# Patient Record
Sex: FEMALE | Race: WHITE | NOT HISPANIC OR LATINO
[De-identification: names, ages, dates, MRNs, and addresses within clinical notes are randomized per-mention and may not be internally consistent; named-entity substitution may affect disease eponyms.]

---

## 2017-06-08 ENCOUNTER — APPOINTMENT (OUTPATIENT)
Dept: PULMONOLOGY | Facility: CLINIC | Age: 77
End: 2017-06-08

## 2017-06-08 VITALS
DIASTOLIC BLOOD PRESSURE: 60 MMHG | WEIGHT: 169.5 LBS | BODY MASS INDEX: 33.1 KG/M2 | HEART RATE: 79 BPM | SYSTOLIC BLOOD PRESSURE: 110 MMHG | OXYGEN SATURATION: 96 %

## 2017-06-08 RX ORDER — CIPROFLOXACIN HYDROCHLORIDE 500 MG/1
500 TABLET, FILM COATED ORAL
Qty: 20 | Refills: 0 | Status: DISCONTINUED | COMMUNITY
Start: 2017-02-27

## 2017-06-08 RX ORDER — ALBUTEROL SULFATE 90 UG/1
108 (90 BASE) AEROSOL, METERED RESPIRATORY (INHALATION)
Qty: 1 | Refills: 3 | Status: DISCONTINUED | COMMUNITY
Start: 2017-06-08 | End: 2017-06-08

## 2017-08-25 ENCOUNTER — APPOINTMENT (OUTPATIENT)
Dept: CARDIOLOGY | Facility: CLINIC | Age: 77
End: 2017-08-25

## 2017-10-26 ENCOUNTER — APPOINTMENT (OUTPATIENT)
Dept: CARDIOLOGY | Facility: CLINIC | Age: 77
End: 2017-10-26
Payer: MEDICARE

## 2017-10-26 VITALS
DIASTOLIC BLOOD PRESSURE: 81 MMHG | SYSTOLIC BLOOD PRESSURE: 124 MMHG | HEART RATE: 92 BPM | WEIGHT: 168 LBS | OXYGEN SATURATION: 97 % | BODY MASS INDEX: 32.81 KG/M2

## 2017-10-26 DIAGNOSIS — Z82.0 FAMILY HISTORY OF EPILEPSY AND OTHER DISEASES OF THE NERVOUS SYSTEM: ICD-10-CM

## 2017-10-26 DIAGNOSIS — Z83.3 FAMILY HISTORY OF DIABETES MELLITUS: ICD-10-CM

## 2017-10-26 DIAGNOSIS — Z81.8 FAMILY HISTORY OF OTHER MENTAL AND BEHAVIORAL DISORDERS: ICD-10-CM

## 2017-10-26 PROCEDURE — 99204 OFFICE O/P NEW MOD 45 MIN: CPT

## 2017-10-26 PROCEDURE — 93000 ELECTROCARDIOGRAM COMPLETE: CPT

## 2017-10-29 ENCOUNTER — NON-APPOINTMENT (OUTPATIENT)
Age: 77
End: 2017-10-29

## 2017-10-29 PROBLEM — Z81.8 FAMILY HISTORY OF DEMENTIA: Status: ACTIVE | Noted: 2017-10-26

## 2017-11-21 ENCOUNTER — APPOINTMENT (OUTPATIENT)
Dept: CARDIOLOGY | Facility: CLINIC | Age: 77
End: 2017-11-21
Payer: MEDICARE

## 2017-11-21 PROCEDURE — A9500: CPT

## 2017-11-21 PROCEDURE — 93306 TTE W/DOPPLER COMPLETE: CPT

## 2017-11-21 PROCEDURE — 93015 CV STRESS TEST SUPVJ I&R: CPT

## 2017-11-21 PROCEDURE — 78452 HT MUSCLE IMAGE SPECT MULT: CPT

## 2017-12-07 ENCOUNTER — APPOINTMENT (OUTPATIENT)
Dept: PULMONOLOGY | Facility: CLINIC | Age: 77
End: 2017-12-07
Payer: MEDICARE

## 2017-12-07 VITALS
WEIGHT: 170 LBS | SYSTOLIC BLOOD PRESSURE: 125 MMHG | BODY MASS INDEX: 33.2 KG/M2 | OXYGEN SATURATION: 98 % | DIASTOLIC BLOOD PRESSURE: 70 MMHG | HEART RATE: 76 BPM

## 2017-12-07 PROCEDURE — 99214 OFFICE O/P EST MOD 30 MIN: CPT

## 2018-07-12 ENCOUNTER — APPOINTMENT (OUTPATIENT)
Dept: PULMONOLOGY | Facility: CLINIC | Age: 78
End: 2018-07-12
Payer: MEDICARE

## 2018-07-12 VITALS
OXYGEN SATURATION: 96 % | HEART RATE: 92 BPM | WEIGHT: 164 LBS | SYSTOLIC BLOOD PRESSURE: 118 MMHG | DIASTOLIC BLOOD PRESSURE: 80 MMHG | BODY MASS INDEX: 32.03 KG/M2

## 2018-07-12 DIAGNOSIS — R91.8 OTHER NONSPECIFIC ABNORMAL FINDING OF LUNG FIELD: ICD-10-CM

## 2018-07-12 PROCEDURE — 99214 OFFICE O/P EST MOD 30 MIN: CPT | Mod: 25

## 2018-07-12 PROCEDURE — 94010 BREATHING CAPACITY TEST: CPT

## 2018-07-12 RX ORDER — ALBUTEROL SULFATE 108 UG/1
108 (90 BASE) AEROSOL, METERED RESPIRATORY (INHALATION)
Qty: 1 | Refills: 5 | Status: DISCONTINUED | COMMUNITY
Start: 2017-06-08 | End: 2018-07-12

## 2018-07-12 RX ORDER — ALBUTEROL SULFATE 90 UG/1
108 (90 BASE) AEROSOL, METERED RESPIRATORY (INHALATION)
Qty: 1 | Refills: 6 | Status: COMPLETED | COMMUNITY
Start: 2018-07-12 | End: 2018-07-12

## 2018-10-29 ENCOUNTER — APPOINTMENT (OUTPATIENT)
Dept: CARDIOLOGY | Facility: CLINIC | Age: 78
End: 2018-10-29
Payer: MEDICARE

## 2018-10-29 ENCOUNTER — NON-APPOINTMENT (OUTPATIENT)
Age: 78
End: 2018-10-29

## 2018-10-29 VITALS — SYSTOLIC BLOOD PRESSURE: 127 MMHG | DIASTOLIC BLOOD PRESSURE: 78 MMHG | OXYGEN SATURATION: 95 %

## 2018-10-29 VITALS
DIASTOLIC BLOOD PRESSURE: 83 MMHG | HEART RATE: 90 BPM | RESPIRATION RATE: 16 BRPM | SYSTOLIC BLOOD PRESSURE: 131 MMHG | WEIGHT: 179 LBS | HEIGHT: 62 IN | BODY MASS INDEX: 32.94 KG/M2

## 2018-10-29 DIAGNOSIS — Z87.891 PERSONAL HISTORY OF NICOTINE DEPENDENCE: ICD-10-CM

## 2018-10-29 DIAGNOSIS — R09.89 OTHER SPECIFIED SYMPTOMS AND SIGNS INVOLVING THE CIRCULATORY AND RESPIRATORY SYSTEMS: ICD-10-CM

## 2018-10-29 DIAGNOSIS — Z87.09 PERSONAL HISTORY OF OTHER DISEASES OF THE RESPIRATORY SYSTEM: ICD-10-CM

## 2018-10-29 PROCEDURE — 99214 OFFICE O/P EST MOD 30 MIN: CPT

## 2018-10-29 PROCEDURE — 93000 ELECTROCARDIOGRAM COMPLETE: CPT

## 2018-11-08 ENCOUNTER — APPOINTMENT (OUTPATIENT)
Dept: CARDIOLOGY | Facility: CLINIC | Age: 78
End: 2018-11-08
Payer: MEDICARE

## 2018-11-08 PROCEDURE — 93925 LOWER EXTREMITY STUDY: CPT

## 2019-06-13 ENCOUNTER — NON-APPOINTMENT (OUTPATIENT)
Age: 79
End: 2019-06-13

## 2019-06-13 ENCOUNTER — APPOINTMENT (OUTPATIENT)
Dept: CARDIOLOGY | Facility: CLINIC | Age: 79
End: 2019-06-13
Payer: MEDICARE

## 2019-06-13 VITALS — RESPIRATION RATE: 16 BRPM | WEIGHT: 170 LBS | HEIGHT: 61 IN | BODY MASS INDEX: 32.1 KG/M2

## 2019-06-13 VITALS
DIASTOLIC BLOOD PRESSURE: 70 MMHG | OXYGEN SATURATION: 98 % | HEIGHT: 61 IN | WEIGHT: 170 LBS | BODY MASS INDEX: 32.1 KG/M2 | RESPIRATION RATE: 16 BRPM | HEART RATE: 90 BPM | SYSTOLIC BLOOD PRESSURE: 126 MMHG

## 2019-06-13 DIAGNOSIS — R01.1 CARDIAC MURMUR, UNSPECIFIED: ICD-10-CM

## 2019-06-13 PROCEDURE — 99214 OFFICE O/P EST MOD 30 MIN: CPT

## 2019-06-13 PROCEDURE — 93000 ELECTROCARDIOGRAM COMPLETE: CPT

## 2019-06-13 RX ORDER — ALBUTEROL SULFATE 90 UG/1
108 (90 BASE) AEROSOL, METERED RESPIRATORY (INHALATION)
Qty: 1 | Refills: 3 | Status: DISCONTINUED | COMMUNITY
Start: 2018-07-12 | End: 2019-06-13

## 2019-06-13 RX ORDER — FLUTICASONE PROPIONATE 50 UG/1
50 SPRAY, METERED NASAL
Qty: 1 | Refills: 3 | Status: DISCONTINUED | COMMUNITY
Start: 2017-06-08 | End: 2019-06-13

## 2019-06-13 NOTE — ASSESSMENT
[FreeTextEntry1] : pt will see PMD for blood work\par Lipid panel is not known, she will forward labs to me\par BP is to goal\par FU with Dr Chavez\par Coronary artery calcification, does not want statins. we will check labs when available\par FU in 1 year if no new symptoms\par Patient was advised to partake in 150 minutes of moderate exercise per week.\par

## 2019-06-13 NOTE — HISTORY OF PRESENT ILLNESS
[FreeTextEntry1] : 78 yo male who is seen with hyperlipidemia and coronary calcification. \par \par She has COPD, does not smoke any longer. Quit over 16 years ago. No cp or palpitations but does get short of breath when climbing uphill. She exercise when she is in [place] half of the year. \par \par TTE 11,2017: Normal LV function, grade I DD, mild aortic valve regurgitation.\par nuclear stress test in 2017: Negative for ischemia, GI uptake in the inferoseptal region. .

## 2019-06-13 NOTE — REVIEW OF SYSTEMS
[Dyspnea on exertion] : dyspnea during exertion [Joint Pain] : joint pain [Recent Weight Gain (___ Lbs)] : no recent weight gain [Feeling Fatigued] : not feeling fatigued [Headache] : no headache [Blurry Vision] : no blurred vision [Recent Weight Loss (___ Lbs)] : no recent weight loss [Seeing Double (Diplopia)] : no diplopia [Discharge From The Ears] : no discharge from the ears [Earache] : no earache [Eyeglasses] : not currently wearing eyeglasses [Sore Throat] : no sore throat [Chest  Pressure] : no chest pressure [Chest Pain] : no chest pain [Lower Ext Edema] : no extremity edema [Cough] : no cough [Wheezing] : no wheezing [Palpitations] : no palpitations [Abdominal Pain] : no abdominal pain [Nausea] : no nausea [Heartburn] : no heartburn [Incontinence] : no incontinence [Change in Appetite] : no change in appetite [Dysuria] : no dysuria [Joint Swelling] : no joint swelling [Muscle Cramps] : no muscle cramps [Itching] : no itching [Limb Weakness (Paresis)] : no limb weakness [Skin: A Rash] : no rash: [Dizziness] : no dizziness [Skin Lesions] : no skin lesions [Tingling (Paresthesia)] : no tingling [Convulsions] : no convulsions [Tremor] : no tremor was seen [Confusion] : no confusion was observed [Anxiety] : no anxiety [Memory Lapses Or Loss] : no memory lapses or loss [Excessive Thirst] : no polydipsia [Easy Bleeding] : no tendency for easy bleeding [Swollen Glands] : no swollen glands [Easy Bruising] : no tendency for easy bruising

## 2019-06-13 NOTE — PHYSICAL EXAM
[Normal Appearance] : normal appearance [Well Groomed] : well groomed [General Appearance - In No Acute Distress] : no acute distress [Normal Conjunctiva] : the conjunctiva exhibited no abnormalities [Normal Oropharynx] : normal oropharynx [Normal Oral Mucosa] : normal oral mucosa [Normal Jugular Venous V Waves Present] : normal jugular venous V waves present [Normal Jugular Venous A Waves Present] : normal jugular venous A waves present [Auscultation Breath Sounds / Voice Sounds] : lungs were clear to auscultation bilaterally [Respiration, Rhythm And Depth] : normal respiratory rhythm and effort [Chest Palpation] : palpation of the chest revealed no abnormalities [Heart Rate And Rhythm] : heart rate and rhythm were normal [Heart Sounds] : normal S1 and S2 [Abdomen Soft] : soft [Bowel Sounds] : normal bowel sounds [Abdomen Tenderness] : non-tender [] : no hepato-splenomegaly [Abnormal Walk] : normal gait [Cyanosis, Localized] : no localized cyanosis [Nail Clubbing] : no clubbing of the fingernails [Petechial Hemorrhages (___cm)] : no petechial hemorrhages [Skin Color & Pigmentation] : normal skin color and pigmentation [Skin Turgor] : normal skin turgor [Impaired Insight] : insight and judgment were intact [No Venous Stasis] : no venous stasis [Oriented To Time, Place, And Person] : oriented to person, place, and time [Affect] : the affect was normal [FreeTextEntry1] : 1+ b/l dp/pt pulses

## 2019-07-18 ENCOUNTER — APPOINTMENT (OUTPATIENT)
Dept: PULMONOLOGY | Facility: CLINIC | Age: 79
End: 2019-07-18
Payer: MEDICARE

## 2019-07-18 VITALS
BODY MASS INDEX: 32.31 KG/M2 | HEART RATE: 74 BPM | WEIGHT: 171 LBS | SYSTOLIC BLOOD PRESSURE: 118 MMHG | DIASTOLIC BLOOD PRESSURE: 78 MMHG | OXYGEN SATURATION: 96 %

## 2019-07-18 PROCEDURE — 99214 OFFICE O/P EST MOD 30 MIN: CPT

## 2019-07-18 NOTE — REASON FOR VISIT
[Follow-Up] : a follow-up visit [Abnormal CT Scan] : abnormal CT Scan [Cough] : cough [FreeTextEntry2] : Lung nodule

## 2019-07-18 NOTE — PROCEDURE
[FreeTextEntry1] : CT scan reviewed December 2016; mild bronchiectasis, coronary artery calcifications stable right lower lobe nodule no suspicious abnormalities -compared to November 2014

## 2019-07-18 NOTE — CONSULT LETTER
[Dear  ___] : Dear  [unfilled], [Consult Letter:] : I had the pleasure of evaluating your patient, [unfilled]. [Please see my note below.] : Please see my note below. [Consult Closing:] : Thank you very much for allowing me to participate in the care of this patient.  If you have any questions, please do not hesitate to contact me. [Sincerely,] : Sincerely, [[facility]il[facility], [facility]] : Edilberto Chavez DO, [facility] [Director, Respiratory Care] : Director, Respiratory Care [[facility]] : [facility] [FreeTextEntry3] : [facility]\par Pul[facility] Critical Care\par Director Pulmonary Division\par Medical Director Respiratory Therapy\par [facility]\par \par

## 2019-07-18 NOTE — HISTORY OF PRESENT ILLNESS
[FreeTextEntry1] : dyspnea at baseline\par no fever, chill, chest pain\par denies GERD or rhinitis\par former 40 pack yr smoker, quit 15 yrs ago

## 2019-07-18 NOTE — DISCUSSION/SUMMARY
[FreeTextEntry1] : COPD class I, mild emphysema on CT scan with  stable subcentimeter nodule on CT scan 12/16\par quit smoking 15 yrs ago, does not qualify for low dose rad this yr\par repeat  CXR ordered\par P.r.elfego Wilks, has symbicort samples if worsens\par currently asymptomatic, exam without bronchospasm\par spirometry remains with normal flows, very mild obstruction, no change from 2016\par Vaccinations this fall\par followed by Cardiology\par follow up 8  months or sooner if needed\par

## 2019-07-18 NOTE — PHYSICAL EXAM
[General Appearance - Well Developed] : well developed [Normal Appearance] : normal appearance [Well Groomed] : well groomed [General Appearance - Well Nourished] : well nourished [No Deformities] : no deformities [Heart Rate And Rhythm] : heart rate and rhythm were normal [Heart Sounds] : normal S1 and S2 [Murmurs] : no murmurs present [Edema] : no peripheral edema present [Respiration, Rhythm And Depth] : normal respiratory rhythm and effort [Exaggerated Use Of Accessory Muscles For Inspiration] : no accessory muscle use [Auscultation Breath Sounds / Voice Sounds] : lungs were clear to auscultation bilaterally [Abnormal Walk] : normal gait [] : no rash [No Focal Deficits] : no focal deficits [Oriented To Time, Place, And Person] : oriented to person, place, and time [Impaired Insight] : insight and judgment were intact [Affect] : the affect was normal [Normal Oral Mucosa] : normal oral mucosa [Nail Clubbing] : no clubbing of the fingernails [Cyanosis, Localized] : no localized cyanosis

## 2020-11-02 ENCOUNTER — APPOINTMENT (OUTPATIENT)
Dept: PULMONOLOGY | Facility: CLINIC | Age: 80
End: 2020-11-02
Payer: MEDICARE

## 2020-11-02 VITALS
HEIGHT: 60.5 IN | SYSTOLIC BLOOD PRESSURE: 118 MMHG | WEIGHT: 175 LBS | DIASTOLIC BLOOD PRESSURE: 60 MMHG | BODY MASS INDEX: 33.47 KG/M2 | OXYGEN SATURATION: 90 % | HEART RATE: 82 BPM

## 2020-11-02 VITALS — OXYGEN SATURATION: 95 %

## 2020-11-02 VITALS — OXYGEN SATURATION: 94 %

## 2020-11-02 PROCEDURE — 99215 OFFICE O/P EST HI 40 MIN: CPT

## 2020-11-02 NOTE — CONSULT LETTER
[Dear  ___] : Dear  [unfilled], [Consult Letter:] : I had the pleasure of evaluating your patient, [unfilled]. [Please see my note below.] : Please see my note below. [Consult Closing:] : Thank you very much for allowing me to participate in the care of this patient.  If you have any questions, please do not hesitate to contact me. [Sincerely,] : Sincerely, [[facility]to [facility]avez DO, [facility]] : Edilberto Chavez DO, [facility] [Director, Respiratory Care] : Director, Respiratory Care [[facility]] : [facility] [FreeTextEntry3] : [facility]\par Pulmonary Critical Care\par Director Pulmonary Division\par Medical Director Respiratory Therapy\par [facility]\par \par

## 2020-11-02 NOTE — DISCUSSION/SUMMARY
[FreeTextEntry1] : COPD class I, mild emphysema on CT scan with  stable subcentimeter nodule on CT scan 12/16\par quit smoking 16 yrs ago, does not qualify for low dose rad this yr\par baseline CXR ordered\par P.r.n. Proventil, \par currently asymptomatic, exam without bronchospasm\par last spirometry with normal flows, very mild obstruction, no change from 2016\par Vaccinations this fall planned, had pneumovax\par followed by Cardiology\par follow up 6  months or sooner if needed\par

## 2020-11-05 ENCOUNTER — APPOINTMENT (OUTPATIENT)
Dept: CARDIOLOGY | Facility: CLINIC | Age: 80
End: 2020-11-05
Payer: MEDICARE

## 2020-11-05 ENCOUNTER — NON-APPOINTMENT (OUTPATIENT)
Age: 80
End: 2020-11-05

## 2020-11-05 VITALS
BODY MASS INDEX: 33.47 KG/M2 | DIASTOLIC BLOOD PRESSURE: 80 MMHG | RESPIRATION RATE: 14 BRPM | SYSTOLIC BLOOD PRESSURE: 140 MMHG | OXYGEN SATURATION: 94 % | HEIGHT: 60.5 IN | WEIGHT: 175 LBS | HEART RATE: 76 BPM | TEMPERATURE: 98.4 F

## 2020-11-05 DIAGNOSIS — R07.89 OTHER CHEST PAIN: ICD-10-CM

## 2020-11-05 DIAGNOSIS — R07.9 CHEST PAIN, UNSPECIFIED: ICD-10-CM

## 2020-11-05 DIAGNOSIS — Z86.39 PERSONAL HISTORY OF OTHER ENDOCRINE, NUTRITIONAL AND METABOLIC DISEASE: ICD-10-CM

## 2020-11-05 PROCEDURE — 93000 ELECTROCARDIOGRAM COMPLETE: CPT

## 2020-11-05 PROCEDURE — 99214 OFFICE O/P EST MOD 30 MIN: CPT

## 2020-11-08 PROBLEM — Z86.39 HISTORY OF MIXED HYPERLIPIDEMIA: Status: ACTIVE | Noted: 2019-06-13

## 2020-11-08 PROBLEM — R07.89 ATYPICAL CHEST PAIN: Status: ACTIVE | Noted: 2020-11-05

## 2020-11-08 NOTE — REVIEW OF SYSTEMS
[Headache] : no headache [Recent Weight Gain (___ Lbs)] : recent [unfilled] ~Ulb weight gain [Feeling Fatigued] : not feeling fatigued [Recent Weight Loss (___ Lbs)] : no recent weight loss [Blurry Vision] : no blurred vision [Seeing Double (Diplopia)] : no diplopia [Eyeglasses] : not currently wearing eyeglasses [Earache] : no earache [Discharge From The Ears] : no discharge from the ears [Sore Throat] : no sore throat [Dyspnea on exertion] : dyspnea during exertion [Chest  Pressure] : no chest pressure [Chest Pain] : chest pain [Lower Ext Edema] : no extremity edema [Palpitations] : no palpitations [Cough] : no cough [Wheezing] : no wheezing [Abdominal Pain] : no abdominal pain [Nausea] : no nausea [Heartburn] : no heartburn [Change in Appetite] : no change in appetite [Dysuria] : no dysuria [Incontinence] : no incontinence [Joint Pain] : joint pain [Joint Swelling] : no joint swelling [Muscle Cramps] : no muscle cramps [Limb Weakness (Paresis)] : no limb weakness [Skin: A Rash] : no rash: [Itching] : no itching [Skin Lesions] : no skin lesions [Dizziness] : no dizziness [Tremor] : no tremor was seen [Convulsions] : no convulsions [Tingling (Paresthesia)] : no tingling [Confusion] : no confusion was observed [Memory Lapses Or Loss] : no memory lapses or loss [Anxiety] : no anxiety [Excessive Thirst] : no polydipsia [Easy Bleeding] : no tendency for easy bleeding [Swollen Glands] : no swollen glands [Easy Bruising] : no tendency for easy bruising

## 2020-11-08 NOTE — PHYSICAL EXAM
[Normal Appearance] : normal appearance [Well Groomed] : well groomed [Normal Conjunctiva] : the conjunctiva exhibited no abnormalities [Normal Oral Mucosa] : normal oral mucosa [Normal Oropharynx] : normal oropharynx [Normal Jugular Venous A Waves Present] : normal jugular venous A waves present [Normal Jugular Venous V Waves Present] : normal jugular venous V waves present [Respiration, Rhythm And Depth] : normal respiratory rhythm and effort [Auscultation Breath Sounds / Voice Sounds] : lungs were clear to auscultation bilaterally [Chest Palpation] : palpation of the chest revealed no abnormalities [Heart Rate And Rhythm] : heart rate and rhythm were normal [Heart Sounds] : normal S1 and S2 [Edema] : no peripheral edema present [FreeTextEntry1] : 1+ b/l dp/pt pulses  [Bowel Sounds] : normal bowel sounds [Abdomen Soft] : soft [Abdomen Tenderness] : non-tender [] : no hepato-splenomegaly [Abnormal Walk] : normal gait [Nail Clubbing] : no clubbing of the fingernails [Cyanosis, Localized] : no localized cyanosis [Petechial Hemorrhages (___cm)] : no petechial hemorrhages [Skin Color & Pigmentation] : normal skin color and pigmentation [Skin Turgor] : normal skin turgor [No Venous Stasis] : no venous stasis [Oriented To Time, Place, And Person] : oriented to person, place, and time [Impaired Insight] : insight and judgment were intact [Affect] : the affect was normal

## 2020-11-08 NOTE — HISTORY OF PRESENT ILLNESS
[FreeTextEntry1] : 79 yo male who is seen with hyperlipidemia chest pain and coronary calcification. \par \par She has COPD, does not smoke any longer.\par She states that over the past few months she is that to 3 episodes of sharp pain under the sternum, without any radiation, not related to activity or body movement.  Episodes last short period of time, usually less than a minute.  She denies any chest discomfort with activity.  No shortness of breath or leg edema.  \par \par TTE 11,2017: Normal LV function, grade I DD, mild aortic valve regurgitation.\par nuclear stress test in 2017: Negative for ischemia, GI uptake in the inferoseptal region. .\par \par EKG with normal sinus rhythm, 70 bpm, early RS transition and left anterior hemiblock.  No significant changes on EKG.

## 2020-11-08 NOTE — ASSESSMENT
[FreeTextEntry1] : 80-year-old female with COPD, atypical chest pain and untreated hyperlipidemia.  Her blood pressure initially was elevated but on repeat it was 132/78.\par Patient is complaining of atypical chest pain, not related to activity, not related to food.\par Symptoms have been present for the past 2 to 3 months.  She previously had a nuclear stress test over 3 years ago.  Given degree of coronary calcification seen on CAT scan, had new symptoms I advised her to undergo stress testing.  She knows that if her symptoms worsen to call 911 and go to the nearest emergency room.  She is advised not to exercise and to take a low-dose aspirin daily.  Once we can exclude CAD as a cause of her symptoms, she may need to see a gastroenterologist for further evaluation and possibly upper endoscopy.  Patient will see her primary care physician and have blood work in the next couple of weeks and once the results are available she will forward a copy to us.

## 2020-11-24 ENCOUNTER — APPOINTMENT (OUTPATIENT)
Dept: CARDIOLOGY | Facility: CLINIC | Age: 80
End: 2020-11-24
Payer: MEDICARE

## 2020-11-24 PROCEDURE — 78452 HT MUSCLE IMAGE SPECT MULT: CPT

## 2020-11-24 PROCEDURE — A9500: CPT

## 2020-11-24 PROCEDURE — 93015 CV STRESS TEST SUPVJ I&R: CPT

## 2021-05-28 ENCOUNTER — APPOINTMENT (OUTPATIENT)
Dept: PULMONOLOGY | Facility: CLINIC | Age: 81
End: 2021-05-28
Payer: MEDICARE

## 2021-05-28 VITALS — OXYGEN SATURATION: 96 %

## 2021-05-28 VITALS — WEIGHT: 175 LBS | BODY MASS INDEX: 33.61 KG/M2 | DIASTOLIC BLOOD PRESSURE: 82 MMHG | SYSTOLIC BLOOD PRESSURE: 140 MMHG

## 2021-05-28 PROCEDURE — 99213 OFFICE O/P EST LOW 20 MIN: CPT

## 2021-05-28 NOTE — CONSULT LETTER
[Dear  ___] : Dear  [unfilled], [Consult Letter:] : I had the pleasure of evaluating your patient, [unfilled]. [Please see my note below.] : Please see my note below. [Consult Closing:] : Thank you very much for allowing me to participate in the care of this patient.  If you have any questions, please do not hesitate to contact me. [Sincerely,] : Sincerely, [Edil[facility]to Chavez DO, [facility]] : Edilberto Chavez DO, [facility] [Director, Respiratory Care] : Director, Respiratory Care [[facility]] : [facility] [FreeTextEntry3] : [facility] DO [facility]\par Pulmonary Critical Care\par Director Pulmonary Division\par Medical Director Respiratory Therapy\par [facility]\par \par

## 2021-05-28 NOTE — HISTORY OF PRESENT ILLNESS
[FreeTextEntry1] : dyspnea at baseline\par no fever, chill, chest pain\par denies GERD or rhinitis\par former 40 pack yr smoker, quit 16 yrs ago\par had covid vaccine

## 2021-05-28 NOTE — DISCUSSION/SUMMARY
[FreeTextEntry1] : COPD class I, mild emphysema on CT scan, CXR 11/20 negative\par quit smoking 17 yrs ago, does not qualify for lung cancer screening\par P.r.n. Proventil, \par currently asymptomatic, exam without bronchospasm\par last spirometry with normal flows, very mild obstruction, no change from 2016, will repeat\par Vaccinations: had pneumovax, up to date with Covid vaccine\par followed by Cardiology\par follow up 6  months or sooner if needed with CXR, PFts\par

## 2021-11-11 ENCOUNTER — NON-APPOINTMENT (OUTPATIENT)
Age: 81
End: 2021-11-11

## 2021-11-11 ENCOUNTER — APPOINTMENT (OUTPATIENT)
Dept: CARDIOLOGY | Facility: CLINIC | Age: 81
End: 2021-11-11
Payer: MEDICARE

## 2021-11-11 VITALS
WEIGHT: 176 LBS | OXYGEN SATURATION: 98 % | RESPIRATION RATE: 16 BRPM | TEMPERATURE: 98 F | HEART RATE: 77 BPM | BODY MASS INDEX: 34.55 KG/M2 | SYSTOLIC BLOOD PRESSURE: 135 MMHG | DIASTOLIC BLOOD PRESSURE: 76 MMHG | HEIGHT: 60 IN

## 2021-11-11 DIAGNOSIS — Z00.00 ENCOUNTER FOR GENERAL ADULT MEDICAL EXAMINATION W/OUT ABNORMAL FINDINGS: ICD-10-CM

## 2021-11-11 DIAGNOSIS — I25.84 ATHEROSCLEROTIC HEART DISEASE OF NATIVE CORONARY ARTERY W/OUT ANGINA PECTORIS: ICD-10-CM

## 2021-11-11 DIAGNOSIS — I35.1 NONRHEUMATIC AORTIC (VALVE) INSUFFICIENCY: ICD-10-CM

## 2021-11-11 DIAGNOSIS — I25.10 ATHEROSCLEROTIC HEART DISEASE OF NATIVE CORONARY ARTERY W/OUT ANGINA PECTORIS: ICD-10-CM

## 2021-11-11 PROCEDURE — 93000 ELECTROCARDIOGRAM COMPLETE: CPT

## 2021-11-11 PROCEDURE — 99213 OFFICE O/P EST LOW 20 MIN: CPT

## 2021-11-14 ENCOUNTER — APPOINTMENT (OUTPATIENT)
Dept: DISASTER EMERGENCY | Facility: CLINIC | Age: 81
End: 2021-11-14

## 2021-11-14 PROBLEM — I25.10 CORONARY ARTERY CALCIFICATION: Status: ACTIVE | Noted: 2017-10-29

## 2021-11-14 NOTE — ASSESSMENT
[FreeTextEntry1] : 81-year-old female with COPD, former smoker and coronary artery calcification.\par Spoke to patient regarding coronary artery calcification, stress test is negative last year.\par Patient had blood work at PMDs office recently and will forward a copy of the results for review.\par May need to be started on statin therapy. \par History of aortic insufficiency, recommended to undergo repeat echocardiography.\par COPD followed by pulmonologist.  On albuterol on as needed basis.\par Patient was advised to partake in 150 minutes of moderate exercise per week.\par Patient was advised to see us in 1 year if no new symptoms or earlier with any change in symptoms.\par \par

## 2021-11-14 NOTE — HISTORY OF PRESENT ILLNESS
[FreeTextEntry1] : 81-year-old female accompanied by her  due to valvular heart disease and coronary artery calcification.  She does not do smoke any longer but has COPD.\par Denies having any chest discomfort.  Active chest by walking does not exercise usually.\par Denies any syncope or presyncope.\par Denies having any palpitations.\par \par Pharmacologic nuclear stress test was performed in November 2020 and was negative for ischemia.\par Echocardiogram from 2017 normal LV systolic function LVEF 62% grade 1 diastolic dysfunction mild aortic valve regurgitation.\par \par EKG with normal sinus rhythm heart rate 68 bpm T wave inversion in anterior precordial leads seen on prior EKGs.

## 2021-11-14 NOTE — REVIEW OF SYSTEMS
[Weight Gain (___ Lbs)] : [unfilled] ~Ulb weight gain [Dyspnea on exertion] : dyspnea during exertion [Joint Pain] : joint pain [Negative] : Heme/Lymph

## 2021-11-14 NOTE — PHYSICAL EXAM
[Well Developed] : well developed [Well Nourished] : well nourished [Normal Conjunctiva] : normal conjunctiva [Normal Venous Pressure] : normal venous pressure [Normal S1, S2] : normal S1, S2 [Normal] : alert and oriented, normal memory [de-identified] : 3 out of 6 diastolic murmur left sternal border

## 2021-11-18 ENCOUNTER — APPOINTMENT (OUTPATIENT)
Dept: PULMONOLOGY | Facility: CLINIC | Age: 81
End: 2021-11-18
Payer: MEDICARE

## 2021-11-18 VITALS — BODY MASS INDEX: 34.36 KG/M2 | WEIGHT: 175 LBS | HEIGHT: 60 IN

## 2021-11-18 VITALS
DIASTOLIC BLOOD PRESSURE: 74 MMHG | OXYGEN SATURATION: 99 % | RESPIRATION RATE: 16 BRPM | SYSTOLIC BLOOD PRESSURE: 120 MMHG | HEART RATE: 95 BPM

## 2021-11-18 PROCEDURE — 99214 OFFICE O/P EST MOD 30 MIN: CPT | Mod: 25

## 2021-11-18 PROCEDURE — 85018 HEMOGLOBIN: CPT | Mod: QW

## 2021-11-18 PROCEDURE — 94727 GAS DIL/WSHOT DETER LNG VOL: CPT

## 2021-11-18 PROCEDURE — 94729 DIFFUSING CAPACITY: CPT

## 2021-11-18 PROCEDURE — 94010 BREATHING CAPACITY TEST: CPT

## 2021-11-18 NOTE — PROCEDURE
[FreeTextEntry1] : CXR 11/20;NAD\par PFTs today: mild air flow obstruction, normal flows, normal TLC and  mild DLCO impairment which corrects

## 2021-11-18 NOTE — DISCUSSION/SUMMARY
[FreeTextEntry1] : COPD class I, mild emphysema on CT scan, CXR 11/20 negative\par quit smoking 18 yrs ago, does not qualify for lung cancer screening\par P.r.n. Proventil, \par currently asymptomatic, exam without bronchospasm\par PFTs  with normal flows, very mild obstruction\par Vaccinations: had pneumovax, up to date with Covid vaccine x 3\par followed by Cardiology, input noted\par needs updated CXR\par weight loss encouraged\par follow up 6  months or sooner if needed \par

## 2021-11-18 NOTE — HISTORY OF PRESENT ILLNESS
[FreeTextEntry1] : dyspnea at baseline\par no fever, chill, chest pain\par denies GERD or rhinitis\par former 40 pack yr smoker, quit 16 yrs ago\par had covid vaccine x 3

## 2021-11-18 NOTE — CONSULT LETTER
[Dear  ___] : Dear  [unfilled], [Consult Letter:] : I had the pleasure of evaluating your patient, [unfilled]. [Please see my note below.] : Please see my note below. [Consult Closing:] : Thank you very much for allowing me to participate in the care of this patient.  If you have any questions, please do not hesitate to contact me. [Sincerely,] : Sincerely, [Edil[facility]to Chavez DO, [facility]] : Edilberto Chavez DO, [facility] [Director, Respiratory Care] : Director, Respiratory Care [[facility]] : [facility] [FreeTextEntry3] : [facility] DO [facility]\par Pulmonary Critical Care\par Director Pulmonary Division\par Medical Director Respiratory Therapy\par [facility]\par \par

## 2021-11-24 ENCOUNTER — APPOINTMENT (OUTPATIENT)
Dept: CARDIOLOGY | Facility: CLINIC | Age: 81
End: 2021-11-24
Payer: MEDICARE

## 2021-11-24 PROCEDURE — 93306 TTE W/DOPPLER COMPLETE: CPT

## 2021-11-24 RX ORDER — PERFLUTREN 6.52 MG/ML
6.52 INJECTION, SUSPENSION INTRAVENOUS
Qty: 2 | Refills: 0 | Status: COMPLETED | OUTPATIENT
Start: 2021-11-24

## 2021-11-24 RX ADMIN — PERFLUTREN MG/ML: 6.52 INJECTION, SUSPENSION INTRAVENOUS at 00:00

## 2022-06-03 ENCOUNTER — APPOINTMENT (OUTPATIENT)
Dept: PULMONOLOGY | Facility: CLINIC | Age: 82
End: 2022-06-03
Payer: MEDICARE

## 2022-06-03 VITALS
RESPIRATION RATE: 16 BRPM | DIASTOLIC BLOOD PRESSURE: 78 MMHG | WEIGHT: 172 LBS | BODY MASS INDEX: 32.47 KG/M2 | HEIGHT: 61 IN | SYSTOLIC BLOOD PRESSURE: 124 MMHG

## 2022-06-03 VITALS — OXYGEN SATURATION: 95 % | HEART RATE: 70 BPM

## 2022-06-03 PROCEDURE — 99214 OFFICE O/P EST MOD 30 MIN: CPT

## 2022-06-03 NOTE — DISCUSSION/SUMMARY
[FreeTextEntry1] : COPD class I, mild emphysema on CT scan, CXR 11/20 negative\par quit smoking 18 yrs ago, does not qualify for lung cancer screening\par P.r.n. Proventil, \par currently asymptomatic, exam without bronchospasm\par PFTs  with normal flows, very mild obstruction\par Vaccinations: had pneumovax, up to date with Covid vaccine x 3\par followed by Cardiology, input noted\par needs updated CXR 11/22\par weight loss encouraged\par follow up 6-8  months or sooner if needed \par

## 2022-06-03 NOTE — CONSULT LETTER
[Dear  ___] : Dear  [unfilled], [Consult Letter:] : I had the pleasure of evaluating your patient, [unfilled]. [Please see my note below.] : Please see my note below. [Consult Closing:] : Thank you very much for allowing me to participate in the care of this patient.  If you have any questions, please do not hesitate to contact me. [Sincerely,] : Sincerely, [[facility] DO, [facility]] : Edilberto Chavez DO, [facility] [Director, Respiratory Care] : Director, Respiratory Care [[facility]] : [facility] [FreeTextEntry3] : [facility]\par Pulmonary Critical Care\par Director Pulmonary Division\par Medical Director Respiratory Therapy\par [facility]\par \par

## 2022-11-21 ENCOUNTER — APPOINTMENT (OUTPATIENT)
Dept: CARDIOLOGY | Facility: CLINIC | Age: 82
End: 2022-11-21

## 2022-12-01 ENCOUNTER — APPOINTMENT (OUTPATIENT)
Dept: PULMONOLOGY | Facility: CLINIC | Age: 82
End: 2022-12-01

## 2022-12-01 VITALS
DIASTOLIC BLOOD PRESSURE: 80 MMHG | WEIGHT: 178 LBS | SYSTOLIC BLOOD PRESSURE: 126 MMHG | BODY MASS INDEX: 33.61 KG/M2 | HEIGHT: 61 IN | RESPIRATION RATE: 16 BRPM

## 2022-12-01 VITALS — OXYGEN SATURATION: 93 % | HEART RATE: 70 BPM

## 2022-12-01 PROCEDURE — 99213 OFFICE O/P EST LOW 20 MIN: CPT

## 2023-05-24 ENCOUNTER — APPOINTMENT (OUTPATIENT)
Dept: PULMONOLOGY | Facility: CLINIC | Age: 83
End: 2023-05-24
Payer: MEDICARE

## 2023-05-24 VITALS — BODY MASS INDEX: 31.93 KG/M2 | DIASTOLIC BLOOD PRESSURE: 80 MMHG | SYSTOLIC BLOOD PRESSURE: 130 MMHG | WEIGHT: 169 LBS

## 2023-05-24 VITALS — RESPIRATION RATE: 16 BRPM | HEART RATE: 83 BPM | OXYGEN SATURATION: 95 %

## 2023-05-24 DIAGNOSIS — R06.09 OTHER FORMS OF DYSPNEA: ICD-10-CM

## 2023-05-24 PROCEDURE — 99213 OFFICE O/P EST LOW 20 MIN: CPT

## 2023-05-24 NOTE — DISCUSSION/SUMMARY
[FreeTextEntry1] : COPD class I, mild emphysema on CT scan, CXR 11/22 negative, has HH\par At baseline, no acute pulmonary complaints\par P.r.n. Proventil, \par currently asymptomatic, exam without bronchospasm\par PFTs  11/21 with normal flows, very mild obstruction, will repeat at follow up\par No daytime hypersomnolence reported,  denied snoring in past\par Follow up 6- months or sooner if needed with spirometry\par

## 2023-05-24 NOTE — HISTORY OF PRESENT ILLNESS
[FreeTextEntry1] : dyspnea near baseline\par no fever, chill, chest pain\par denies GERD or rhinitis\par cough resolved\par no fever, chill, chest pain\par former 40 pack yr smoker, quit 16 yrs ago\par Saw cardiology, w/u planned, echo, stress

## 2023-05-24 NOTE — DISCUSSION/SUMMARY
[FreeTextEntry1] : COPD class I, mild emphysema on CT scan, CXR 11/22 negative, has HH\par At baseline, no acute pulmonary complaints\par P.r.n. Proventil, \par currently asymptomatic, exam without bronchospasm\par PFTs 11/21 with normal flows, very mild obstruction, will repeat at follow up\par No daytime hypersomnolence reported,  denies snoring\par Follow up 6-8 months or sooner if needed\par . \par \par

## 2023-05-24 NOTE — HISTORY OF PRESENT ILLNESS
[YearQuit] : 2002 [FreeTextEntry1] : dyspnea at baseline\par no fever, chill, chest pain\par denies GERD or rhinitis\par former 40 pack yr smoker, quit 16 yrs ago\par had covid vaccine x 3, had flu vaccine

## 2023-05-24 NOTE — CONSULT LETTER
[Dear  ___] : Dear  [unfilled], [Consult Letter:] : I had the pleasure of evaluating your patient, [unfilled]. [Please see my note below.] : Please see my note below. [Consult Closing:] : Thank you very much for allowing me to participate in the care of this patient.  If you have any questions, please do not hesitate to contact me. [Sincerely,] : Sincerely, [Ed[facility]to [facility]avez DO, [facility]] : Edilberto Chavez DO, [facility] [Director, Respiratory Care] : Director, Respiratory Care [[facility]] : [facility] [FreeTextEntry3] : [facility]to [facility] DO [facility]\par Pulmonary Critical Care\par Director Pulmonary Division\par Medical Director Respiratory Therapy\par [facility]\par \par

## 2023-05-24 NOTE — CONSULT LETTER
[Dear  ___] : Dear  [unfilled], [Consult Letter:] : I had the pleasure of evaluating your patient, [unfilled]. [Please see my note below.] : Please see my note below. [Consult Closing:] : Thank you very much for allowing me to participate in the care of this patient.  If you have any questions, please do not hesitate to contact me. [Sincerely,] : Sincerely, [[facility] DO, [facility]] : Edilberto Chavez DO, [facility] [Director, Respiratory Care] : Director, Respiratory Care [[facility]] : [facility] [FreeTextEntry3] : [facility] DO [facility]\par Pulmonary Critical Care\par Director Pulmonary Division\par Medical Director Respiratory Therapy\par [facility]\par \par

## 2023-05-24 NOTE — PROCEDURE
[FreeTextEntry1] : CXR 11/22;NAD, HH\par PFTs 11/21 : mild air flow obstruction, normal flows, normal TLC and  mild DLCO impairment which corrects

## 2023-05-26 ENCOUNTER — OFFICE (OUTPATIENT)
Dept: URBAN - METROPOLITAN AREA CLINIC 105 | Facility: CLINIC | Age: 83
Setting detail: OPHTHALMOLOGY
End: 2023-05-26
Payer: MEDICARE

## 2023-05-26 DIAGNOSIS — H35.361: ICD-10-CM

## 2023-05-26 DIAGNOSIS — H43.12: ICD-10-CM

## 2023-05-26 DIAGNOSIS — H43.813: ICD-10-CM

## 2023-05-26 DIAGNOSIS — H35.363: ICD-10-CM

## 2023-05-26 DIAGNOSIS — H33.312: ICD-10-CM

## 2023-05-26 DIAGNOSIS — H35.362: ICD-10-CM

## 2023-05-26 DIAGNOSIS — H35.372: ICD-10-CM

## 2023-05-26 PROCEDURE — 92134 CPTRZ OPH DX IMG PST SGM RTA: CPT | Performed by: OPHTHALMOLOGY

## 2023-05-26 PROCEDURE — 92012 INTRM OPH EXAM EST PATIENT: CPT | Performed by: OPHTHALMOLOGY

## 2023-05-26 ASSESSMENT — REFRACTION_CURRENTRX
OD_VPRISM_DIRECTION: SV
OD_CYLINDER: SPH
OD_AXIS: 000
OS_VPRISM_DIRECTION: SV
OS_CYLINDER: SPH
OS_OVR_VA: 20/
OD_SPHERE: +2.00
OS_SPHERE: +2.00
OS_AXIS: 000
OD_OVR_VA: 20/

## 2023-05-26 ASSESSMENT — REFRACTION_MANIFEST
OS_AXIS: 105
OS_CYLINDER: -0.50
OD_AXIS: 080
OD_VA1: 20/50
OS_ADD: +2.50
OD_CYLINDER: -1.00
OD_ADD: +2.50
OD_SPHERE: -0.75
OS_VA1: 20/50
OS_SPHERE: -0.50

## 2023-05-26 ASSESSMENT — SPHEQUIV_DERIVED
OS_SPHEQUIV: -0.75
OD_SPHEQUIV: 0.25
OS_SPHEQUIV: 0.125
OD_SPHEQUIV: -1.25

## 2023-05-26 ASSESSMENT — LACRIMAL DUCT - ASSESSMENT
OS_LACRIMAL_DUCT: NORMAL TEAR LAKE
OD_LACRIMAL_DUCT: NORMAL TEAR LAKE

## 2023-05-26 ASSESSMENT — KERATOMETRY
OD_AXISANGLE_DEGREES: 113
OS_K2POWER_DIOPTERS: 43.75
OS_AXISANGLE_DEGREES: 003
OD_K1POWER_DIOPTERS: 43.50
OD_K2POWER_DIOPTERS: 43.75
OS_K1POWER_DIOPTERS: 43.50

## 2023-05-26 ASSESSMENT — AXIALLENGTH_DERIVED
OS_AL: 23.8408
OS_AL: 23.4977
OD_AL: 24.0414
OD_AL: 23.4495

## 2023-05-26 ASSESSMENT — REFRACTION_AUTOREFRACTION
OS_CYLINDER: -0.75
OD_SPHERE: +0.50
OD_CYLINDER: -0.50
OD_AXIS: 095
OS_SPHERE: +0.50
OS_AXIS: 089

## 2023-05-26 ASSESSMENT — VISUAL ACUITY
OS_BCVA: 20/25
OD_BCVA: 20/20-1

## 2023-05-26 ASSESSMENT — SUPERFICIAL PUNCTATE KERATITIS (SPK)
OD_SPK: 1+
OS_SPK: T

## 2023-05-26 ASSESSMENT — CONFRONTATIONAL VISUAL FIELD TEST (CVF)
OS_FINDINGS: FULL
OD_FINDINGS: FULL

## 2023-05-26 ASSESSMENT — LID POSITION - DERMATOCHALASIS
OS_DERMATOCHALASIS: LLL LUL
OD_DERMATOCHALASIS: RLL RUL

## 2023-05-26 ASSESSMENT — LID EXAM ASSESSMENTS
OS_BLEPHARITIS: LLL LUL 1+
OD_BLEPHARITIS: RLL RUL 1+

## 2023-08-25 ENCOUNTER — OFFICE (OUTPATIENT)
Dept: URBAN - METROPOLITAN AREA CLINIC 1 | Facility: CLINIC | Age: 83
Setting detail: OPHTHALMOLOGY
End: 2023-08-25
Payer: MEDICARE

## 2023-08-25 DIAGNOSIS — H01.002: ICD-10-CM

## 2023-08-25 DIAGNOSIS — H35.362: ICD-10-CM

## 2023-08-25 DIAGNOSIS — H01.005: ICD-10-CM

## 2023-08-25 DIAGNOSIS — H35.363: ICD-10-CM

## 2023-08-25 DIAGNOSIS — H35.361: ICD-10-CM

## 2023-08-25 DIAGNOSIS — H26.493: ICD-10-CM

## 2023-08-25 DIAGNOSIS — H35.372: ICD-10-CM

## 2023-08-25 DIAGNOSIS — H01.001: ICD-10-CM

## 2023-08-25 DIAGNOSIS — H01.004: ICD-10-CM

## 2023-08-25 DIAGNOSIS — H43.813: ICD-10-CM

## 2023-08-25 DIAGNOSIS — H33.312: ICD-10-CM

## 2023-08-25 DIAGNOSIS — H16.223: ICD-10-CM

## 2023-08-25 PROCEDURE — 92014 COMPRE OPH EXAM EST PT 1/>: CPT

## 2023-08-25 PROCEDURE — 92134 CPTRZ OPH DX IMG PST SGM RTA: CPT

## 2023-08-25 ASSESSMENT — LID EXAM ASSESSMENTS
OS_BLEPHARITIS: LLL LUL 1+
OD_BLEPHARITIS: RLL RUL 1+

## 2023-08-25 ASSESSMENT — LACRIMAL DUCT - ASSESSMENT
OD_LACRIMAL_DUCT: NORMAL TEAR LAKE
OS_LACRIMAL_DUCT: NORMAL TEAR LAKE

## 2023-08-25 ASSESSMENT — REFRACTION_AUTOREFRACTION
OD_CYLINDER: -0.50
OS_SPHERE: +0.75
OD_SPHERE: +0.50
OS_CYLINDER: -1.00
OD_AXIS: 95
OS_AXIS: 90

## 2023-08-25 ASSESSMENT — KERATOMETRY
OD_AXISANGLE_DEGREES: 090
OS_K2POWER_DIOPTERS: 43.75
OD_K2POWER_DIOPTERS: 43.50
OS_K1POWER_DIOPTERS: 43.50
METHOD_AUTO_MANUAL: AUTO
OS_AXISANGLE_DEGREES: 180
OD_K1POWER_DIOPTERS: 43.50

## 2023-08-25 ASSESSMENT — REFRACTION_CURRENTRX
OD_VPRISM_DIRECTION: SV
OD_AXIS: 000
OD_SPHERE: +2.00
OS_CYLINDER: SPH
OD_OVR_VA: 20/
OD_VPRISM_DIRECTION: SV
OD_SPHERE: +2.50
OS_AXIS: 000
OS_OVR_VA: 20/
OD_OVR_VA: 20/
OS_OVR_VA: 20/
OD_CYLINDER: SPH
OS_VPRISM_DIRECTION: SV
OS_SPHERE: +2.00
OS_SPHERE: +2.50
OS_VPRISM_DIRECTION: SV

## 2023-08-25 ASSESSMENT — VISUAL ACUITY
OS_BCVA: 20/25-2
OD_BCVA: 20/25-2

## 2023-08-25 ASSESSMENT — REFRACTION_MANIFEST
OS_ADD: +2.50
OD_ADD: +2.50
OS_SPHERE: -0.50
OD_CYLINDER: -1.00
OS_AXIS: 90
OS_VA1: 20/20
OS_ADD: +2.50
OD_AXIS: 080
OD_SPHERE: PLANO
OS_SPHERE: +0.25
OD_AXIS: 95
OD_CYLINDER: -0.50
OS_AXIS: 105
OS_CYLINDER: -0.75
OD_ADD: +2.50
OD_VA1: 20/50
OD_VA1: 20/20
OD_SPHERE: -0.75
OS_VA1: 20/50
OS_CYLINDER: -0.50

## 2023-08-25 ASSESSMENT — LID POSITION - DERMATOCHALASIS
OD_DERMATOCHALASIS: RLL RUL
OS_DERMATOCHALASIS: LLL LUL

## 2023-08-25 ASSESSMENT — TONOMETRY
OS_IOP_MMHG: 16
OD_IOP_MMHG: 16

## 2023-08-25 ASSESSMENT — SUPERFICIAL PUNCTATE KERATITIS (SPK)
OS_SPK: T
OD_SPK: 1+

## 2023-08-25 ASSESSMENT — SPHEQUIV_DERIVED
OS_SPHEQUIV: -0.125
OD_SPHEQUIV: 0.25
OS_SPHEQUIV: 0.25
OS_SPHEQUIV: -0.75
OD_SPHEQUIV: -1.25

## 2023-08-25 ASSESSMENT — AXIALLENGTH_DERIVED
OS_AL: 23.8408
OD_AL: 24.0892
OD_AL: 23.4949
OS_AL: 23.5947
OS_AL: 23.4495

## 2023-08-25 ASSESSMENT — CONFRONTATIONAL VISUAL FIELD TEST (CVF)
OS_FINDINGS: FULL
OD_FINDINGS: FULL

## 2023-09-14 ENCOUNTER — RX ONLY (RX ONLY)
Age: 83
End: 2023-09-14

## 2023-09-14 ENCOUNTER — OFFICE (OUTPATIENT)
Dept: URBAN - METROPOLITAN AREA CLINIC 100 | Facility: CLINIC | Age: 83
Setting detail: OPHTHALMOLOGY
End: 2023-09-14
Payer: MEDICARE

## 2023-09-14 DIAGNOSIS — H16.223: ICD-10-CM

## 2023-09-14 DIAGNOSIS — H01.004: ICD-10-CM

## 2023-09-14 DIAGNOSIS — H02.834: ICD-10-CM

## 2023-09-14 DIAGNOSIS — H02.831: ICD-10-CM

## 2023-09-14 DIAGNOSIS — H01.005: ICD-10-CM

## 2023-09-14 DIAGNOSIS — H01.002: ICD-10-CM

## 2023-09-14 DIAGNOSIS — H01.001: ICD-10-CM

## 2023-09-14 DIAGNOSIS — D49.2: ICD-10-CM

## 2023-09-14 PROCEDURE — 67840 REMOVE EYELID LESION: CPT | Performed by: OPHTHALMOLOGY

## 2023-09-14 PROCEDURE — 92285 EXTERNAL OCULAR PHOTOGRAPHY: CPT | Performed by: OPHTHALMOLOGY

## 2023-09-14 PROCEDURE — 99213 OFFICE O/P EST LOW 20 MIN: CPT | Performed by: OPHTHALMOLOGY

## 2023-09-14 ASSESSMENT — VISUAL ACUITY
OS_BCVA: 20/20-2
OD_BCVA: 20/20-3

## 2023-09-14 ASSESSMENT — LID EXAM ASSESSMENTS
OD_BLEPHARITIS: RLL RUL 1+
OD_COMMENTS: 2+ LAXIT
OS_COMMENTS: 2+ LAXIT
OS_BLEPHARITIS: LLL LUL 1+

## 2023-09-14 ASSESSMENT — AXIALLENGTH_DERIVED
OS_AL: 23.4495
OD_AL: 23.4949

## 2023-09-14 ASSESSMENT — REFRACTION_AUTOREFRACTION
OS_SPHERE: +0.75
OD_AXIS: 95
OS_CYLINDER: -1.00
OS_AXIS: 90
OD_SPHERE: +0.50
OD_CYLINDER: -0.50

## 2023-09-14 ASSESSMENT — SPHEQUIV_DERIVED
OS_SPHEQUIV: 0.25
OD_SPHEQUIV: 0.25

## 2023-09-14 ASSESSMENT — KERATOMETRY
OD_AXISANGLE_DEGREES: 090
OD_K1POWER_DIOPTERS: 43.50
METHOD_AUTO_MANUAL: AUTO
OS_K2POWER_DIOPTERS: 43.75
OS_K1POWER_DIOPTERS: 43.50
OD_K2POWER_DIOPTERS: 43.50
OS_AXISANGLE_DEGREES: 180

## 2023-09-14 ASSESSMENT — LID POSITION - DERMATOCHALASIS
OS_DERMATOCHALASIS: LUL
OD_DERMATOCHALASIS: RUL

## 2023-09-14 ASSESSMENT — CONFRONTATIONAL VISUAL FIELD TEST (CVF)
OS_FINDINGS: FULL
OD_FINDINGS: FULL

## 2023-09-14 ASSESSMENT — SUPERFICIAL PUNCTATE KERATITIS (SPK)
OD_SPK: 1+
OS_SPK: T

## 2023-11-14 ENCOUNTER — APPOINTMENT (OUTPATIENT)
Dept: PULMONOLOGY | Facility: CLINIC | Age: 83
End: 2023-11-14
Payer: MEDICARE

## 2023-11-14 VITALS — RESPIRATION RATE: 16 BRPM | SYSTOLIC BLOOD PRESSURE: 110 MMHG | DIASTOLIC BLOOD PRESSURE: 80 MMHG

## 2023-11-14 VITALS — OXYGEN SATURATION: 97 % | HEART RATE: 60 BPM

## 2023-11-14 VITALS — WEIGHT: 173 LBS | BODY MASS INDEX: 33.96 KG/M2 | HEIGHT: 60 IN

## 2023-11-14 PROCEDURE — 99214 OFFICE O/P EST MOD 30 MIN: CPT | Mod: 25

## 2023-11-14 PROCEDURE — 94010 BREATHING CAPACITY TEST: CPT

## 2023-11-27 ENCOUNTER — NON-APPOINTMENT (OUTPATIENT)
Age: 83
End: 2023-11-27

## 2023-11-27 ENCOUNTER — RX CHANGE (OUTPATIENT)
Age: 83
End: 2023-11-27

## 2023-11-27 RX ORDER — ALBUTEROL SULFATE 90 UG/1
108 (90 BASE) INHALANT RESPIRATORY (INHALATION)
Qty: 1 | Refills: 5 | Status: COMPLETED | COMMUNITY
Start: 2023-11-14 | End: 2023-11-27

## 2023-11-27 RX ORDER — ALBUTEROL SULFATE 90 UG/1
108 (90 BASE) INHALANT RESPIRATORY (INHALATION)
Qty: 1 | Refills: 5 | Status: COMPLETED | COMMUNITY
Start: 2020-11-02 | End: 2023-11-27

## 2023-11-27 RX ORDER — ALBUTEROL SULFATE 90 UG/1
108 (90 BASE) AEROSOL, METERED RESPIRATORY (INHALATION)
Qty: 3 | Refills: 0 | Status: ACTIVE | COMMUNITY
Start: 2023-05-24 | End: 1900-01-01

## 2024-01-05 NOTE — HISTORY REVIEWED
[History reviewed] : History reviewed. [Medications and Allergies reviewed] : Medications and allergies reviewed. on the discharge service for the patient. I have reviewed and made amendments to the documentation where necessary.

## 2024-05-01 ENCOUNTER — APPOINTMENT (OUTPATIENT)
Dept: PULMONOLOGY | Facility: CLINIC | Age: 84
End: 2024-05-01

## 2024-05-10 ENCOUNTER — NON-APPOINTMENT (OUTPATIENT)
Age: 84
End: 2024-05-10

## 2024-05-10 DIAGNOSIS — R93.89 ABNORMAL FINDINGS ON DIAGNOSTIC IMAGING OF OTHER SPECIFIED BODY STRUCTURES: ICD-10-CM

## 2024-05-16 ENCOUNTER — NON-APPOINTMENT (OUTPATIENT)
Age: 84
End: 2024-05-16

## 2024-05-16 DIAGNOSIS — R93.89 ABNORMAL FINDINGS ON DIAGNOSTIC IMAGING OF OTHER SPECIFIED BODY STRUCTURES: ICD-10-CM

## 2024-05-23 ENCOUNTER — NON-APPOINTMENT (OUTPATIENT)
Age: 84
End: 2024-05-23

## 2024-06-12 ENCOUNTER — APPOINTMENT (OUTPATIENT)
Dept: PULMONOLOGY | Facility: CLINIC | Age: 84
End: 2024-06-12

## 2024-06-18 ENCOUNTER — APPOINTMENT (OUTPATIENT)
Dept: PULMONOLOGY | Facility: CLINIC | Age: 84
End: 2024-06-18
Payer: MEDICARE

## 2024-06-18 VITALS
DIASTOLIC BLOOD PRESSURE: 78 MMHG | SYSTOLIC BLOOD PRESSURE: 124 MMHG | RESPIRATION RATE: 16 BRPM | BODY MASS INDEX: 33.77 KG/M2 | WEIGHT: 172 LBS | HEIGHT: 60 IN

## 2024-06-18 VITALS — HEART RATE: 88 BPM | OXYGEN SATURATION: 95 %

## 2024-06-18 DIAGNOSIS — R05.9 COUGH, UNSPECIFIED: ICD-10-CM

## 2024-06-18 DIAGNOSIS — R91.1 SOLITARY PULMONARY NODULE: ICD-10-CM

## 2024-06-18 DIAGNOSIS — J43.9 EMPHYSEMA, UNSPECIFIED: ICD-10-CM

## 2024-06-18 PROCEDURE — 99214 OFFICE O/P EST MOD 30 MIN: CPT

## 2024-06-18 PROCEDURE — G2211 COMPLEX E/M VISIT ADD ON: CPT

## 2024-06-18 NOTE — PROCEDURE
[FreeTextEntry1] : CT scan : RLL nodular density - incr size from 2016, mod HH, AAA 3.6 berto 11/23: super normal flows, very mild obstruction

## 2024-06-18 NOTE — CONSULT LETTER
[Consult Letter:] : I had the pleasure of evaluating your patient, [unfilled]. [Please see my note below.] : Please see my note below. [Consult Closing:] : Thank you very much for allowing me to participate in the care of this patient.  If you have any questions, please do not hesitate to contact me. [Sincerely,] : Sincerely, [Edilberto Chavez DO, [facility]] : Eidlberto Chavez DO, [facility] [Director, Respiratory Care] : Director, Respiratory Care [[facility]] : [facility] [Dear  ___] : Dear  [unfilled], [FreeTextEntry3] : [facility]\par  Pulmonary Critical Care\par  Director Pulmonary Division\par  Medical Director Respiratory Therapy\par  [facility]\par  \par

## 2024-06-18 NOTE — PHYSICAL EXAM
[General Appearance - Well Developed] : well developed [Normal Appearance] : normal appearance [Well Groomed] : well groomed [General Appearance - Well Nourished] : well nourished [No Deformities] : no deformities [Heart Sounds] : normal S1 and S2 [Heart Rate And Rhythm] : heart rate and rhythm were normal [Murmurs] : no murmurs present [Edema] : no peripheral edema present [Respiration, Rhythm And Depth] : normal respiratory rhythm and effort [Exaggerated Use Of Accessory Muscles For Inspiration] : no accessory muscle use [Auscultation Breath Sounds / Voice Sounds] : lungs were clear to auscultation bilaterally [Abnormal Walk] : normal gait [] : no rash [No Focal Deficits] : no focal deficits [Oriented To Time, Place, And Person] : oriented to person, place, and time [Impaired Insight] : insight and judgment were intact [Affect] : the affect was normal [Normal Oral Mucosa] : normal oral mucosa [Nail Clubbing] : no clubbing of the fingernails [Cyanosis, Localized] : no localized cyanosis

## 2024-06-18 NOTE — HISTORY OF PRESENT ILLNESS
[Former] : former [FreeTextEntry1] : dyspnea near baseline no fever, chill, chest pain denies GERD or rhinitis no fever, chill, chest pain has rhinitis/post nasal drip former 40 pack yr smoker, quit 21 yrs ago [YearQuit] : 2000

## 2024-06-18 NOTE — DISCUSSION/SUMMARY
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 seen in 2016, will repeat CT scan 3 months and re eval need for PET/T surg Follow up 3 months or sooner if needed with berto and CT .

## 2024-10-08 ENCOUNTER — OFFICE (OUTPATIENT)
Dept: URBAN - METROPOLITAN AREA CLINIC 105 | Facility: CLINIC | Age: 84
Setting detail: OPHTHALMOLOGY
End: 2024-10-08
Payer: MEDICARE

## 2024-10-08 DIAGNOSIS — H43.12: ICD-10-CM

## 2024-10-08 DIAGNOSIS — H33.312: ICD-10-CM

## 2024-10-08 DIAGNOSIS — H01.001: ICD-10-CM

## 2024-10-08 DIAGNOSIS — H16.223: ICD-10-CM

## 2024-10-08 DIAGNOSIS — H35.362: ICD-10-CM

## 2024-10-08 DIAGNOSIS — H35.363: ICD-10-CM

## 2024-10-08 DIAGNOSIS — H01.005: ICD-10-CM

## 2024-10-08 DIAGNOSIS — H35.361: ICD-10-CM

## 2024-10-08 DIAGNOSIS — H35.372: ICD-10-CM

## 2024-10-08 DIAGNOSIS — H01.002: ICD-10-CM

## 2024-10-08 DIAGNOSIS — H01.004: ICD-10-CM

## 2024-10-08 DIAGNOSIS — H43.813: ICD-10-CM

## 2024-10-08 PROCEDURE — 92014 COMPRE OPH EXAM EST PT 1/>: CPT | Performed by: STUDENT IN AN ORGANIZED HEALTH CARE EDUCATION/TRAINING PROGRAM

## 2024-10-08 PROCEDURE — 92134 CPTRZ OPH DX IMG PST SGM RTA: CPT | Performed by: STUDENT IN AN ORGANIZED HEALTH CARE EDUCATION/TRAINING PROGRAM

## 2024-10-08 ASSESSMENT — KERATOMETRY
OD_K1POWER_DIOPTERS: 43.50
OS_K1POWER_DIOPTERS: 43.75
METHOD_AUTO_MANUAL: AUTO
OD_AXISANGLE_DEGREES: 066
OS_AXISANGLE_DEGREES: 118
OS_K2POWER_DIOPTERS: 44.00
OD_K2POWER_DIOPTERS: 43.75

## 2024-10-08 ASSESSMENT — SUPERFICIAL PUNCTATE KERATITIS (SPK)
OS_SPK: T
OD_SPK: 1+

## 2024-10-08 ASSESSMENT — LID EXAM ASSESSMENTS
OD_COMMENTS: 2+ LAXIT
OS_COMMENTS: 2+ LAXIT
OD_BLEPHARITIS: RLL RUL 1+
OS_BLEPHARITIS: LLL LUL 1+

## 2024-10-08 ASSESSMENT — REFRACTION_AUTOREFRACTION
OS_AXIS: 078
OS_CYLINDER: -0.75
OD_AXIS: 112
OD_SPHERE: +0.50
OD_CYLINDER: -0.75
OS_SPHERE: +0.25

## 2024-10-08 ASSESSMENT — CONFRONTATIONAL VISUAL FIELD TEST (CVF)
OD_FINDINGS: FULL
OS_FINDINGS: FULL

## 2024-10-08 ASSESSMENT — LID POSITION - DERMATOCHALASIS
OD_DERMATOCHALASIS: RUL
OS_DERMATOCHALASIS: LUL

## 2024-10-08 ASSESSMENT — VISUAL ACUITY
OS_BCVA: 20/25-
OD_BCVA: 20/25-2

## 2024-10-08 ASSESSMENT — TONOMETRY
OD_IOP_MMHG: 18
OS_IOP_MMHG: 17

## 2024-11-21 ENCOUNTER — APPOINTMENT (OUTPATIENT)
Dept: PULMONOLOGY | Facility: CLINIC | Age: 84
End: 2024-11-21
Payer: MEDICARE

## 2024-11-21 VITALS
OXYGEN SATURATION: 95 % | RESPIRATION RATE: 16 BRPM | HEART RATE: 70 BPM | DIASTOLIC BLOOD PRESSURE: 88 MMHG | SYSTOLIC BLOOD PRESSURE: 130 MMHG

## 2024-11-21 VITALS — HEIGHT: 60 IN | WEIGHT: 177 LBS | BODY MASS INDEX: 34.75 KG/M2

## 2024-11-21 DIAGNOSIS — R06.09 OTHER FORMS OF DYSPNEA: ICD-10-CM

## 2024-11-21 DIAGNOSIS — R91.1 SOLITARY PULMONARY NODULE: ICD-10-CM

## 2024-11-21 DIAGNOSIS — J43.9 EMPHYSEMA, UNSPECIFIED: ICD-10-CM

## 2024-11-21 PROCEDURE — 94010 BREATHING CAPACITY TEST: CPT

## 2024-11-21 PROCEDURE — 99215 OFFICE O/P EST HI 40 MIN: CPT | Mod: 25

## 2024-11-21 RX ORDER — FLUTICASONE PROPIONATE AND SALMETEROL 250; 50 UG/1; UG/1
250-50 POWDER RESPIRATORY (INHALATION)
Qty: 1 | Refills: 6 | Status: ACTIVE | COMMUNITY
Start: 2024-11-21 | End: 1900-01-01

## 2024-11-27 ENCOUNTER — NON-APPOINTMENT (OUTPATIENT)
Age: 84
End: 2024-11-27

## 2025-04-02 ENCOUNTER — RX RENEWAL (OUTPATIENT)
Age: 85
End: 2025-04-02

## 2025-04-02 RX ORDER — FLUTICASONE PROPIONATE AND SALMETEROL 250; 50 UG/1; UG/1
250-50 POWDER RESPIRATORY (INHALATION)
Qty: 60 | Refills: 0 | Status: ACTIVE | COMMUNITY
Start: 2025-04-02 | End: 1900-01-01

## 2025-05-12 ENCOUNTER — NON-APPOINTMENT (OUTPATIENT)
Age: 85
End: 2025-05-12

## 2025-05-20 ENCOUNTER — APPOINTMENT (OUTPATIENT)
Dept: PULMONOLOGY | Facility: CLINIC | Age: 85
End: 2025-05-20
Payer: MEDICARE

## 2025-05-20 VITALS
OXYGEN SATURATION: 95 % | SYSTOLIC BLOOD PRESSURE: 130 MMHG | DIASTOLIC BLOOD PRESSURE: 80 MMHG | HEART RATE: 68 BPM | RESPIRATION RATE: 16 BRPM

## 2025-05-20 VITALS — HEIGHT: 60 IN | WEIGHT: 172 LBS | BODY MASS INDEX: 33.77 KG/M2

## 2025-05-20 DIAGNOSIS — R91.1 SOLITARY PULMONARY NODULE: ICD-10-CM

## 2025-05-20 DIAGNOSIS — J43.9 EMPHYSEMA, UNSPECIFIED: ICD-10-CM

## 2025-05-20 PROCEDURE — 99214 OFFICE O/P EST MOD 30 MIN: CPT

## 2025-05-20 PROCEDURE — G2211 COMPLEX E/M VISIT ADD ON: CPT

## 2025-05-20 RX ORDER — ANASTROZOLE TABLETS 1 MG/1
TABLET ORAL
Refills: 0 | Status: ACTIVE | COMMUNITY

## 2025-05-20 RX ORDER — PALBOCICLIB 125 MG/1
CAPSULE ORAL
Refills: 0 | Status: ACTIVE | COMMUNITY

## 2025-06-04 ENCOUNTER — OFFICE (OUTPATIENT)
Dept: URBAN - METROPOLITAN AREA CLINIC 105 | Facility: CLINIC | Age: 85
Setting detail: OPHTHALMOLOGY
End: 2025-06-04
Payer: MEDICARE

## 2025-06-04 DIAGNOSIS — H16.223: ICD-10-CM

## 2025-06-04 PROBLEM — H26.491 POSTERIOR CAPSULAR OPACIFICATION;  , RIGHT EYE: Status: ACTIVE | Noted: 2025-06-04

## 2025-06-04 PROCEDURE — 99213 OFFICE O/P EST LOW 20 MIN: CPT | Performed by: STUDENT IN AN ORGANIZED HEALTH CARE EDUCATION/TRAINING PROGRAM

## 2025-06-04 ASSESSMENT — KERATOMETRY
OS_AXISANGLE_DEGREES: 118
OD_AXISANGLE_DEGREES: 066
OS_K2POWER_DIOPTERS: 44.00
METHOD_AUTO_MANUAL: AUTO
OD_K1POWER_DIOPTERS: 43.50
OS_K1POWER_DIOPTERS: 43.75
OD_K2POWER_DIOPTERS: 43.75

## 2025-06-04 ASSESSMENT — REFRACTION_AUTOREFRACTION
OS_AXIS: 082
OS_CYLINDER: -1.25
OD_SPHERE: +0.75
OD_CYLINDER: -0.75
OD_AXIS: 097
OS_SPHERE: +0.50

## 2025-06-04 ASSESSMENT — LID POSITION - DERMATOCHALASIS
OS_DERMATOCHALASIS: LUL
OD_DERMATOCHALASIS: RUL

## 2025-06-04 ASSESSMENT — VISUAL ACUITY
OS_BCVA: 20/25-1
OD_BCVA: 20/25-2

## 2025-06-04 ASSESSMENT — LID EXAM ASSESSMENTS
OD_COMMENTS: 2+ LAXIT
OD_BLEPHARITIS: RLL RUL 1+
OS_COMMENTS: 2+ LAXIT
OS_BLEPHARITIS: LLL LUL 1+

## 2025-06-04 ASSESSMENT — CONFRONTATIONAL VISUAL FIELD TEST (CVF)
OS_FINDINGS: FULL
OD_FINDINGS: FULL

## 2025-06-04 ASSESSMENT — SUPERFICIAL PUNCTATE KERATITIS (SPK)
OS_SPK: T
OD_SPK: 1+